# Patient Record
Sex: FEMALE | Race: WHITE
[De-identification: names, ages, dates, MRNs, and addresses within clinical notes are randomized per-mention and may not be internally consistent; named-entity substitution may affect disease eponyms.]

---

## 2017-02-16 ENCOUNTER — HOSPITAL ENCOUNTER (OUTPATIENT)
Dept: HOSPITAL 61 - PCVCIMAG | Age: 52
Discharge: HOME | End: 2017-02-16
Attending: INTERNAL MEDICINE
Payer: COMMERCIAL

## 2017-02-16 DIAGNOSIS — R06.02: ICD-10-CM

## 2017-02-16 DIAGNOSIS — R00.0: ICD-10-CM

## 2017-02-16 DIAGNOSIS — R53.83: ICD-10-CM

## 2017-02-16 DIAGNOSIS — I07.1: ICD-10-CM

## 2017-02-16 DIAGNOSIS — I10: ICD-10-CM

## 2017-02-16 DIAGNOSIS — I48.91: Primary | ICD-10-CM

## 2017-02-16 DIAGNOSIS — E78.5: ICD-10-CM

## 2017-02-16 PROCEDURE — 93306 TTE W/DOPPLER COMPLETE: CPT

## 2017-02-16 PROCEDURE — 96374 THER/PROPH/DIAG INJ IV PUSH: CPT

## 2017-06-09 ENCOUNTER — HOSPITAL ENCOUNTER (OUTPATIENT)
Dept: HOSPITAL 61 - PCVCIMAG | Age: 52
Discharge: HOME | End: 2017-06-09
Attending: INTERNAL MEDICINE
Payer: COMMERCIAL

## 2017-06-09 DIAGNOSIS — I48.91: ICD-10-CM

## 2017-06-09 DIAGNOSIS — I08.1: Primary | ICD-10-CM

## 2017-06-09 PROCEDURE — 93306 TTE W/DOPPLER COMPLETE: CPT

## 2017-06-09 PROCEDURE — 93017 CV STRESS TEST TRACING ONLY: CPT

## 2017-12-07 ENCOUNTER — HOSPITAL ENCOUNTER (OUTPATIENT)
Dept: HOSPITAL 61 - PCVCIMAG | Age: 52
Discharge: HOME | End: 2017-12-07
Attending: INTERNAL MEDICINE
Payer: COMMERCIAL

## 2017-12-07 DIAGNOSIS — I10: ICD-10-CM

## 2017-12-07 DIAGNOSIS — I48.91: Primary | ICD-10-CM

## 2017-12-07 DIAGNOSIS — E78.00: ICD-10-CM

## 2017-12-07 PROCEDURE — 93306 TTE W/DOPPLER COMPLETE: CPT

## 2017-12-07 NOTE — PCVCIMAG
--------------- APPROVED REPORT --------------





Study performed:  2017 14:38:08



EXAM: Comprehensive 2D, Doppler, and color-flow 

Echocardiogram

Patient Location: Echo lab

Status:  routine



BSA:         2.19

HR: 79 bpmBP:          154/84 mmHg

Rhythm: NSR



Other Information 

Study Quality: Good



Indications

Atrial Fibrillation

Hypertension/HDD

Hyperlipidemia.



2D Dimensions

LVEF(%):  37.06 (&gt;50%)

IVSd:  11.55 (7-11mm)LVOT Diam:  20.11 (18-24mm) 

LVDd:  52.83 mm

PWd:  9.42 (7-11mm)Ascending Ao:  36.41 (22-36mm)

LVDs:  43.32 (25-40mm)

Left Atrium:  45.21 (27-40mm)

Aortic Root:  28.85 mm

Silva's LVEF:  37.06 %



Volumes

Left Atrial Volume (Systole)

Single Plane 4CH:  81.03 mLSingle Plane 2CH:  76.32 mL

LA ESV Index:  39.00 mL/m2



Aortic Valve

AoV Peak Chapito.:  1.54 m/s

AO Peak Gr.:  9.47 mmHgLVOT Max P.33 mmHg

LVOT Max V:  1.04 m/s

MARLO Vmax: 2.15 cm2



Mitral Valve

E/A Ratio:  1.0

MV Decel. Time:  105.69 ms

MV E Max Chapito.:  0.97 m/s

MV A Chapito.:  1.02 m/s

IVRT:  79.58 ms



TDI

E/Lateral E':  6.93E/Medial E':  8.82

Medial E' Chaipto.:  0.11 m/s

Lateral E' Chapito.:  0.14 m/s



Pulmonary Valve

PV Peak Gr.:  4.00 mmHg



Pulmonary Vein

P Vein S:    0.55 m/sP Vein A:  0.29 m/s

P Vein D:   0.43 m/sP Vein A Dur.:  72.7 msec

P Vein S/D Ratio:  1.28



Tricuspid Valve

TR Peak Chapito.:  2.71 m/s

TR Peak Gr.:  29.27 mmHg



Left Ventricle

The left ventricle is normal size. There is normal LV segmental wall 

motion. There is normal left ventricular wall thickness. Left 

ventricular systolic function is normal. The left ventricular 

ejection fraction is within the normal range. LVEF is 55-60%. The 

left ventricular diastolic function is normal.



Right Ventricle

The right ventricle is normal size. The right ventricular systolic 

function is normal.



Atria

Left atrium is mildly dilated. The right atrium size is 

normal.



Aortic Valve

The aortic valve is normal in structure. No aortic regurgitation is 

present. There is no aortic valvular stenosis.



Mitral Valve

The mitral valve is normal in structure. Trace mitral regurgitation. 

No evidence of mitral valve stenosis.



Tricuspid Valve

The tricuspid valve is normal in structure. Trace tricuspid 

regurgitation. Pulmonary artery pressure is 37mmhg.



Pulmonic Valve

The pulmonary valve is normal in structure. There is no pulmonic 

valvular regurgitation.



Great Vessels

The aortic root is normal in size. IVC is normal in size and 

collapses with &gt;50% inspiration



Pericardium

There is no pericardial effusion.



&lt;Conclusion&gt;

The left ventricle is normal size.

LVEF is 55-60%.

The left ventricular diastolic function is normal.

The right ventricle is normal size.

Left atrium is mildly dilated.

No aortic regurgitation is present.

Trace mitral regurgitation.

Trace tricuspid regurgitation. Pulmonary artery pressure is 

37mmhg.

There is no pericardial effusion.

## 2018-10-17 ENCOUNTER — HOSPITAL ENCOUNTER (OUTPATIENT)
Dept: HOSPITAL 61 - PCVCIMAG | Age: 53
Discharge: HOME | End: 2018-10-17
Attending: INTERNAL MEDICINE
Payer: COMMERCIAL

## 2018-10-17 DIAGNOSIS — I48.91: ICD-10-CM

## 2018-10-17 DIAGNOSIS — I08.1: Primary | ICD-10-CM

## 2018-10-17 DIAGNOSIS — I10: ICD-10-CM

## 2018-10-17 PROCEDURE — 93306 TTE W/DOPPLER COMPLETE: CPT

## 2018-10-17 NOTE — PCVCIMAG
--------------- APPROVED REPORT --------------





Study performed:  10/17/2018 10:58:30



EXAM: Comprehensive 2D, Doppler, and color-flow 

Echocardiogram

Patient Location: Echo lab

Status:  routine



BSA:         2.17

HR: 85 bpmBP:          170/90 mmHg

Rhythm: NSR



Other Information 

Study Quality: Adequate



Risk Factors: 

Cardiac Risk Factors:  HTN, Hyperlipidemia



Indications

Atrial Fibrillation

obesity



2D Dimensions

IVSd:  11.05 (7-11mm)

LVDd:  45.38 mm

PWd:  9.94 (7-11mm)Ascending Ao:  36.21 (22-36mm)

LVDs:  30.22 (25-40mm)

Left Atrium:  49.72 (27-40mm)

Aortic Root:  32.11 mm

LV Single Plane 4CH:  61.31 %

LV Single Plane 2CH:  51.06 %

Biplane EF:  54.7 %



Volumes

Left Atrial Volume (Systole)

Single Plane 4CH:  100.48 mLSingle Plane 2CH:  79.60 mL

LA ESV Index:  45.00 mL/m2



Aortic Valve

AoV Peak Chapito.:  1.51 m/s

AO Peak Gr.:  9.15 mmHgLVOT Max P.35 mmHg

LVOT Max V:  1.04 m/s



Mitral Valve

E/A Ratio:  0.9

MV Decel. Time:  239.20 ms

MV E Max Chapito.:  0.55 m/s

MV A Chapito.:  0.61 m/s

MV PHT:  69.37 ms

IVRT:  86.51 ms



Pulmonary Valve

PV Peak Chapito.:  1.31 m/sPV Peak Gr.:  6.84 mmHg



Pulmonary Vein

P Vein S:    0.62 m/sP Vein A:  0.31 m/s

P Vein D:   0.59 m/sP Vein A Dur.:  110.7 msec

P Vein S/D Ratio:  1.05



Tricuspid Valve

TR Peak Chapito.:  2.74 m/s

TR Peak Gr.:  29.99 mmHg

TV Vmax:  0.53 m/s



Left Ventricle

The left ventricle is normal size. There is normal LV segmental wall 

motion. There is normal left ventricular wall thickness. Left 

ventricular systolic function is normal. The left ventricular 

ejection fraction is within the normal range. LVEF is 55-60%. The 

left ventricular diastolic function is normal.



Right Ventricle

The right ventricle is normal size. The right ventricular systolic 

function is normal.



Atria

Left atrium is moderately dilated. The right atrium size is 

normal.



Aortic Valve

The aortic valve is normal in structure. No aortic regurgitation is 

present. There is no aortic valvular stenosis.



Mitral Valve

The mitral valve is normal in structure. Mild mitral regurgitation. 

No evidence of mitral valve stenosis.



Tricuspid Valve

The tricuspid valve is normal in structure. Mild tricuspid 

regurgitation with PAP of 37 mmHg.



Pulmonic Valve

The pulmonary valve is normal in structure. There is no pulmonic 

valvular regurgitation.



Great Vessels

The aortic root is normal in size. IVC is normal in size and 

collapses &gt;50% with inspiration.



Pericardium

There is no pericardial effusion. There is no pleural 

effusion.



&lt;Conclusion&gt;

The left ventricle is normal size.

LVEF is 55-60%.

The right ventricle is normal size.

Left atrium is moderately dilated.

The aortic valve is normal in structure.

Mild mitral regurgitation.

Mild tricuspid regurgitation with PAP of 37 mmHg.

The aortic root is normal in size.

There is no pericardial effusion.